# Patient Record
Sex: FEMALE | Race: OTHER | URBAN - METROPOLITAN AREA
[De-identification: names, ages, dates, MRNs, and addresses within clinical notes are randomized per-mention and may not be internally consistent; named-entity substitution may affect disease eponyms.]

---

## 2017-02-14 ENCOUNTER — IMPORTED ENCOUNTER (OUTPATIENT)
Dept: URBAN - METROPOLITAN AREA CLINIC 9 | Facility: CLINIC | Age: 72
End: 2017-02-14

## 2017-06-20 ENCOUNTER — IMPORTED ENCOUNTER (OUTPATIENT)
Dept: URBAN - METROPOLITAN AREA CLINIC 9 | Facility: CLINIC | Age: 72
End: 2017-06-20

## 2017-10-10 ENCOUNTER — IMPORTED ENCOUNTER (OUTPATIENT)
Dept: URBAN - METROPOLITAN AREA CLINIC 9 | Facility: CLINIC | Age: 72
End: 2017-10-10

## 2018-01-30 ENCOUNTER — IMPORTED ENCOUNTER (OUTPATIENT)
Dept: URBAN - METROPOLITAN AREA CLINIC 9 | Facility: CLINIC | Age: 73
End: 2018-01-30

## 2018-05-22 ENCOUNTER — IMPORTED ENCOUNTER (OUTPATIENT)
Dept: URBAN - METROPOLITAN AREA CLINIC 9 | Facility: CLINIC | Age: 73
End: 2018-05-22

## 2018-08-14 ENCOUNTER — IMPORTED ENCOUNTER (OUTPATIENT)
Dept: URBAN - METROPOLITAN AREA CLINIC 9 | Facility: CLINIC | Age: 73
End: 2018-08-14

## 2018-11-20 ENCOUNTER — IMPORTED ENCOUNTER (OUTPATIENT)
Dept: URBAN - METROPOLITAN AREA CLINIC 9 | Facility: CLINIC | Age: 73
End: 2018-11-20

## 2019-02-26 ENCOUNTER — IMPORTED ENCOUNTER (OUTPATIENT)
Dept: URBAN - METROPOLITAN AREA CLINIC 9 | Facility: CLINIC | Age: 74
End: 2019-02-26

## 2019-06-04 ENCOUNTER — IMPORTED ENCOUNTER (OUTPATIENT)
Dept: URBAN - METROPOLITAN AREA CLINIC 9 | Facility: CLINIC | Age: 74
End: 2019-06-04

## 2019-12-17 ENCOUNTER — IMPORTED ENCOUNTER (OUTPATIENT)
Dept: URBAN - METROPOLITAN AREA CLINIC 9 | Facility: CLINIC | Age: 74
End: 2019-12-17

## 2020-05-19 ENCOUNTER — IMPORTED ENCOUNTER (OUTPATIENT)
Dept: URBAN - METROPOLITAN AREA CLINIC 9 | Facility: CLINIC | Age: 75
End: 2020-05-19

## 2020-06-30 ENCOUNTER — IMPORTED ENCOUNTER (OUTPATIENT)
Dept: URBAN - METROPOLITAN AREA CLINIC 9 | Facility: CLINIC | Age: 75
End: 2020-06-30

## 2020-08-25 ENCOUNTER — IMPORTED ENCOUNTER (OUTPATIENT)
Dept: URBAN - METROPOLITAN AREA CLINIC 9 | Facility: CLINIC | Age: 75
End: 2020-08-25

## 2020-11-17 ENCOUNTER — IMPORTED ENCOUNTER (OUTPATIENT)
Dept: URBAN - METROPOLITAN AREA CLINIC 9 | Facility: CLINIC | Age: 75
End: 2020-11-17

## 2021-01-12 ENCOUNTER — IMPORTED ENCOUNTER (OUTPATIENT)
Dept: URBAN - METROPOLITAN AREA CLINIC 9 | Facility: CLINIC | Age: 76
End: 2021-01-12

## 2021-01-21 ENCOUNTER — IMPORTED ENCOUNTER (OUTPATIENT)
Dept: URBAN - METROPOLITAN AREA CLINIC 9 | Facility: CLINIC | Age: 76
End: 2021-01-21

## 2021-01-26 ENCOUNTER — IMPORTED ENCOUNTER (OUTPATIENT)
Dept: URBAN - METROPOLITAN AREA CLINIC 9 | Facility: CLINIC | Age: 76
End: 2021-01-26

## 2021-02-09 ENCOUNTER — IMPORTED ENCOUNTER (OUTPATIENT)
Dept: URBAN - METROPOLITAN AREA CLINIC 9 | Facility: CLINIC | Age: 76
End: 2021-02-09

## 2021-02-18 ENCOUNTER — IMPORTED ENCOUNTER (OUTPATIENT)
Dept: URBAN - METROPOLITAN AREA CLINIC 9 | Facility: CLINIC | Age: 76
End: 2021-02-18

## 2021-02-23 ENCOUNTER — IMPORTED ENCOUNTER (OUTPATIENT)
Dept: URBAN - METROPOLITAN AREA CLINIC 9 | Facility: CLINIC | Age: 76
End: 2021-02-23

## 2021-03-09 ENCOUNTER — IMPORTED ENCOUNTER (OUTPATIENT)
Dept: URBAN - METROPOLITAN AREA CLINIC 9 | Facility: CLINIC | Age: 76
End: 2021-03-09

## 2021-05-18 ENCOUNTER — IMPORTED ENCOUNTER (OUTPATIENT)
Dept: URBAN - METROPOLITAN AREA CLINIC 9 | Facility: CLINIC | Age: 76
End: 2021-05-18

## 2021-09-07 ENCOUNTER — IMPORTED ENCOUNTER (OUTPATIENT)
Dept: URBAN - METROPOLITAN AREA CLINIC 9 | Facility: CLINIC | Age: 76
End: 2021-09-07

## 2021-10-18 ASSESSMENT — TONOMETRY
OS_IOP_MMHG: 15
OS_IOP_MMHG: 10
OD_IOP_MMHG: 12
OD_IOP_MMHG: 9
OD_IOP_MMHG: 10
OS_IOP_MMHG: 18
OS_IOP_MMHG: 8
OS_IOP_MMHG: 10
OS_IOP_MMHG: 12
OS_IOP_MMHG: 10
OD_IOP_MMHG: 8
OD_IOP_MMHG: 9
OD_IOP_MMHG: 16

## 2021-10-18 ASSESSMENT — VISUAL ACUITY
OD_SC: 20/50 + SN
OS_CC: 20/30 + SN
OS_CC: 20/50 + SN
OS_CC: 20/30 -2 SN
OD_SC: 20/30 -2 SN
OD_CC: 20/20 SN
OD_CC: 20/25 SN
OS_CC: 20/25 - SN
OS_CC: 20/25 SN
OS_SC: 20/60 SN
OS_CC: 20/25 SN
OS_CC: 20/30 - SN
OD_CC: 20/30 - SN
OD_CC: 20/25 SN
OS_CC: 20/25 SN
OD_CC: 20/30 -2 SN
OS_SC: 20/50 SN
OS_SC: 20/40 -2 SN
OS_SC: 20/50 - SN
OD_CC: 20/30 -2 SN
OD_CC: 20/30 + SN
OD_CC: 20/25 - SN
OD_CC: 20/20 SN
OD_SC: 20/25 -2 SN

## 2021-10-18 ASSESSMENT — KERATOMETRY
OD_AXISANGLE_DEGREES: 19
OS_AXISANGLE2_DEGREES: 151
OD_AXISANGLE_DEGREES: 95
OS_K2POWER_DIOPTERS: 46.75
OS_K1POWER_DIOPTERS: 46.25
OD_AXISANGLE_DEGREES: 48
OD_K1POWER_DIOPTERS: 46
OS_AXISANGLE_DEGREES: 61
OD_AXISANGLE2_DEGREES: 5
OD_K1POWER_DIOPTERS: 45.25
OS_K2POWER_DIOPTERS: 46.75
OD_K2POWER_DIOPTERS: 46.25
OD_K1POWER_DIOPTERS: 45.75
OD_AXISANGLE2_DEGREES: 138
OS_K1POWER_DIOPTERS: 45.625
OD_AXISANGLE2_DEGREES: 109
OS_K1POWER_DIOPTERS: 46
OS_K2POWER_DIOPTERS: 46.5
OD_K2POWER_DIOPTERS: 46.25
OS_AXISANGLE_DEGREES: 73
OS_AXISANGLE2_DEGREES: 163
OS_AXISANGLE_DEGREES: 65
OS_AXISANGLE2_DEGREES: 155
OD_K2POWER_DIOPTERS: 46.125

## 2021-12-14 ENCOUNTER — ESTABLISHED PATIENT (OUTPATIENT)
Dept: URBAN - METROPOLITAN AREA CLINIC 4 | Facility: CLINIC | Age: 76
End: 2021-12-14

## 2021-12-14 DIAGNOSIS — G24.5: ICD-10-CM

## 2021-12-14 PROCEDURE — 64612 DESTROY NERVE FACE MUSCLE: CPT

## 2022-03-08 ENCOUNTER — CLINIC PROCEDURE ONLY (OUTPATIENT)
Dept: URBAN - METROPOLITAN AREA CLINIC 4 | Facility: CLINIC | Age: 77
End: 2022-03-08

## 2022-03-08 DIAGNOSIS — G24.5: ICD-10-CM

## 2022-03-08 PROCEDURE — 64612 DESTROY NERVE FACE MUSCLE: CPT

## 2022-06-14 ENCOUNTER — CLINIC PROCEDURE ONLY (OUTPATIENT)
Dept: URBAN - METROPOLITAN AREA CLINIC 4 | Facility: CLINIC | Age: 77
End: 2022-06-14

## 2022-06-14 DIAGNOSIS — G24.5: ICD-10-CM

## 2022-06-14 PROCEDURE — 64612 DESTROY NERVE FACE MUSCLE: CPT

## 2022-09-20 ENCOUNTER — CLINIC PROCEDURE ONLY (OUTPATIENT)
Dept: URBAN - METROPOLITAN AREA CLINIC 4 | Facility: CLINIC | Age: 77
End: 2022-09-20

## 2022-09-20 DIAGNOSIS — G24.5: ICD-10-CM

## 2022-09-20 PROCEDURE — 64612 DESTROY NERVE FACE MUSCLE: CPT

## 2022-11-01 NOTE — PATIENT DISCUSSION
Discussed the Light Adjustable Lens (MARIANO) with the patient. Told this lens gives the most flexibility regarding postoperative refractive outcome as it allows a non-surgical modification of the refractive error leading to less dependence on glasses. Patient made aware that this may require several visits and that the results depend on wearing UV protective glasses for about 3-5 weeks after surgery. Patient informed that the MARIANO may not be implanted on some occasions and need to be substituted by another lens if the pupil is found not to adequately dilate on the day of surgery. Patient informed that this is an elective out of pocket option not covered by payors. Also clearly discussed that there is no need to go with this option if wearing corrective glasses is acceptable.

## 2022-11-01 NOTE — PATIENT DISCUSSION
Long discussion of vision with glasses/contact lenses, patient feels her vision has decreased over the past year and would like to proceed with cataract surgery as she is uncomfortable with her vision even with glasses or contact lenses.

## 2022-12-13 ENCOUNTER — COMPREHENSIVE EXAM (OUTPATIENT)
Dept: URBAN - METROPOLITAN AREA CLINIC 4 | Facility: CLINIC | Age: 77
End: 2022-12-13

## 2022-12-13 PROCEDURE — 64612 DESTROY NERVE FACE MUSCLE: CPT

## 2022-12-13 PROCEDURE — 92014 COMPRE OPH EXAM EST PT 1/>: CPT

## 2022-12-13 PROCEDURE — 92015 DETERMINE REFRACTIVE STATE: CPT

## 2022-12-13 ASSESSMENT — KERATOMETRY
OS_K2POWER_DIOPTERS: 46.25
OD_AXISANGLE_DEGREES: 92
OS_AXISANGLE2_DEGREES: 170
OS_AXISANGLE_DEGREES: 80
OS_K1POWER_DIOPTERS: 45.25
OD_K2POWER_DIOPTERS: 47.25
OD_K1POWER_DIOPTERS: 45.25
OD_AXISANGLE2_DEGREES: 2

## 2022-12-13 ASSESSMENT — VISUAL ACUITY
OU_SC: 20/40
OD_CC: 20/30-2
OS_CC: 20/30
OS_SC: 20/40
OU_GLARE: 20/30
OD_SC: 20/30+1
OD_GLARE: 20/30
OU_CC: 20/30
OS_GLARE: 20/30

## 2022-12-13 ASSESSMENT — TONOMETRY
OD_IOP_MMHG: 16
OS_IOP_MMHG: 15

## 2023-06-27 ENCOUNTER — CLINIC PROCEDURE ONLY (OUTPATIENT)
Dept: URBAN - METROPOLITAN AREA CLINIC 4 | Facility: CLINIC | Age: 78
End: 2023-06-27

## 2023-06-27 DIAGNOSIS — G24.5: ICD-10-CM

## 2023-06-27 PROCEDURE — 64612 DESTROY NERVE FACE MUSCLE: CPT

## 2023-09-19 ENCOUNTER — CLINIC PROCEDURE ONLY (OUTPATIENT)
Dept: URBAN - METROPOLITAN AREA CLINIC 4 | Facility: CLINIC | Age: 78
End: 2023-09-19

## 2023-09-19 DIAGNOSIS — G24.5: ICD-10-CM

## 2023-09-19 PROCEDURE — 64612 DESTROY NERVE FACE MUSCLE: CPT

## 2023-09-19 ASSESSMENT — VISUAL ACUITY
OD_CC: 20/25
OS_CC: 20/30

## 2023-12-12 ENCOUNTER — ESTABLISHED PATIENT (OUTPATIENT)
Facility: LOCATION | Age: 78
End: 2023-12-12

## 2023-12-12 DIAGNOSIS — G24.5: ICD-10-CM

## 2023-12-12 DIAGNOSIS — H04.123: ICD-10-CM

## 2023-12-12 PROCEDURE — 92014 COMPRE OPH EXAM EST PT 1/>: CPT

## 2023-12-12 PROCEDURE — 64612 DESTROY NERVE FACE MUSCLE: CPT

## 2023-12-12 ASSESSMENT — VISUAL ACUITY
OU_SC: 20/25
OS_GLARE: 20/50
OS_SC: 20/25-1
OD_GLARE: 20/50
OS_CC: 20/20
OU_CC: 20/20
OD_CC: 20/25
OD_SC: 20/30+1

## 2023-12-12 ASSESSMENT — KERATOMETRY
OS_K2POWER_DIOPTERS: 46.75
OD_AXISANGLE_DEGREES: 95
OS_K1POWER_DIOPTERS: 45.75
OS_AXISANGLE2_DEGREES: 162
OD_K1POWER_DIOPTERS: 41.50
OD_K2POWER_DIOPTERS: 42.25
OS_AXISANGLE_DEGREES: 72
OD_AXISANGLE2_DEGREES: 5

## 2023-12-12 ASSESSMENT — TONOMETRY
OS_IOP_MMHG: 10
OD_IOP_MMHG: 8

## 2024-03-05 ENCOUNTER — CLINIC PROCEDURE ONLY (OUTPATIENT)
Facility: LOCATION | Age: 79
End: 2024-03-05

## 2024-03-05 DIAGNOSIS — G24.5: ICD-10-CM

## 2024-03-05 PROCEDURE — 64612 DESTROY NERVE FACE MUSCLE: CPT

## 2024-03-05 ASSESSMENT — VISUAL ACUITY
OD_SC: 20/30-1
OU_SC: 20/25
OS_SC: 20/25

## 2024-06-11 ENCOUNTER — CLINIC PROCEDURE ONLY (OUTPATIENT)
Facility: LOCATION | Age: 79
End: 2024-06-11

## 2024-06-11 DIAGNOSIS — G24.5: ICD-10-CM

## 2024-06-11 PROCEDURE — 64612 DESTROY NERVE FACE MUSCLE: CPT

## 2024-06-11 ASSESSMENT — VISUAL ACUITY
OU_CC: 20/20
OD_CC: 20/25-2
OS_CC: 20/25

## 2024-09-03 ENCOUNTER — CLINIC PROCEDURE ONLY (OUTPATIENT)
Facility: LOCATION | Age: 79
End: 2024-09-03

## 2024-09-03 DIAGNOSIS — G24.5: ICD-10-CM

## 2024-09-03 PROCEDURE — 64612 DESTROY NERVE FACE MUSCLE: CPT

## 2025-01-21 ENCOUNTER — CLINIC PROCEDURE ONLY (OUTPATIENT)
Age: 80
End: 2025-01-21

## 2025-01-21 DIAGNOSIS — G24.5: ICD-10-CM

## 2025-01-21 PROCEDURE — 64612 DESTROY NERVE FACE MUSCLE: CPT

## 2025-05-13 ENCOUNTER — COMPREHENSIVE EXAM (OUTPATIENT)
Age: 80
End: 2025-05-13

## 2025-05-13 DIAGNOSIS — H26.493: ICD-10-CM

## 2025-05-13 DIAGNOSIS — H04.123: ICD-10-CM

## 2025-05-13 DIAGNOSIS — G24.5: ICD-10-CM

## 2025-05-13 PROCEDURE — 64612 DESTROY NERVE FACE MUSCLE: CPT

## 2025-05-13 PROCEDURE — 92014 COMPRE OPH EXAM EST PT 1/>: CPT

## 2025-05-13 PROCEDURE — 92015 DETERMINE REFRACTIVE STATE: CPT
